# Patient Record
Sex: FEMALE | Race: WHITE | NOT HISPANIC OR LATINO | Employment: FULL TIME | URBAN - NONMETROPOLITAN AREA
[De-identification: names, ages, dates, MRNs, and addresses within clinical notes are randomized per-mention and may not be internally consistent; named-entity substitution may affect disease eponyms.]

---

## 2018-10-10 ENCOUNTER — OCCUPATIONAL MEDICINE (OUTPATIENT)
Dept: URGENT CARE | Facility: PHYSICIAN GROUP | Age: 73
End: 2018-10-10
Payer: COMMERCIAL

## 2018-10-10 VITALS
HEART RATE: 68 BPM | DIASTOLIC BLOOD PRESSURE: 76 MMHG | OXYGEN SATURATION: 95 % | SYSTOLIC BLOOD PRESSURE: 128 MMHG | WEIGHT: 186 LBS | RESPIRATION RATE: 16 BRPM | HEIGHT: 64 IN | BODY MASS INDEX: 31.76 KG/M2 | TEMPERATURE: 97.4 F

## 2018-10-10 DIAGNOSIS — S86.912D STRAIN OF LEFT KNEE, SUBSEQUENT ENCOUNTER: ICD-10-CM

## 2018-10-10 PROCEDURE — 99203 OFFICE O/P NEW LOW 30 MIN: CPT | Mod: 29 | Performed by: PHYSICIAN ASSISTANT

## 2018-10-10 NOTE — LETTER
[facility]  [place] [address]  Phone:  [phone] - Fax:  [phone]   [facility] Progress Report and Disability Certification  Date of Service: 10/10/2018   No Show:  No  Date / Time of Next Visit:     Claim Information   Patient Name: Angela Sims  Claim Number:     Employer:   [employer] Date of Injury: 10/7/2018     Insurer / TPA: [employer]  ID / SSN:     Occupation: Associate  Diagnosis: The encounter diagnosis was Strain of left knee, subsequent encounter.    Medical Information   Related to Industrial Injury? Yes    Subjective Complaints:  Left knee pain after injury at work on 10/7/2018   Objective Findings: .left knee is without any visual deformity, erythema, edema or ecchymosis.  She has good range of motion albeit somewhat painful.  She has tenderness to palpation in the popliteal space.  Good distal circulation and sensation and strength    Pre-Existing Condition(s):     Assessment:   Condition Improved  Comments:First visit was in the emergency room    Status: Additional Care Required  Comments:Follow-up in 1 week  Permanent Disability:No    Plan: Medication  Comments:Over-the-counter anti-inflammatories    Diagnostics: X-ray  Comments:Negative, from another facility    Comments:       Disability Information   Status:      From:     Through:   Restrictions are:     Physical Restrictions   Sitting:    Standing:  < or = to 6 hrs/day Stoopin hrs/day Bendin hrs/day   Squatting:    Walking:  < or = to 6 hrs/day Climbin hrs/day Pushin hrs/day   Pullin hrs/day Other:    Reaching Above Shoulder (L):   Reaching Above Shoulder (R):       Reaching Below Shoulder (L):    Reaching Below Shoulder (R):      Not to exceed Weight Limits   Carrying(hrs):   Weight Limit(lb): < or = to 10 pounds Lifting(hrs):   Weight  Limit(lb): < or = to 10 pounds   Comments: No prolonged standing or walking.  Specifically no  more than 30 minutes at a time without being able to rest and elevate the left knee    Repetitive Actions   Hands: i.e. Fine Manipulations from Grasping:     Feet: i.e. Operating Foot Controls:     Driving / Operate Machinery:     Physician Name: Maikel Lambert P.A.-C. Physician Signature: MAIKEL Kyle P.A.-C. e-Signature: Dr. José Miguel Freeman, Medical Director   Clinic Name / Location: [address] [address] Clinic Phone Number: Dept: [phone]   Appointment Time: 3:35 Pm Visit Start Time: 4:02 PM   Check-In Time:  3:42 Pm Visit Discharge Time:  4:18 PM   Original-Treating Physician or Chiropractor    Page 2-Insurer/TPA    Page 3-Employer    Page 4-Employee

## 2018-10-10 NOTE — PROGRESS NOTES
Chief Complaint   Patient presents with   • Knee Injury     L knee       HISTORY OF PRESENT ILLNESS: Patient is a 72 y.o. female who presents today because she is here for work comp injury.    Date of injury 10/7/2018.  This is her first visit in urgent care, she was seen at a local emergency room the day after the injury.    She was coming down off of a ladder and missed the last rung and hurt her left knee.    She was evaluated at Local emergency room the next morning, had a negative x-ray for fracture.  She was diagnosed with left knee strain and told to use crutches, compress and anti-inflammatories.  Since she was evaluated 2 days ago, she has been using an Ace wrap, ice, anti-inflammatories and it seems like it is somewhat better but hurts with any bending or stooping.  She is able to walk on it with some pain, no distal paresthesias.          There are no active problems to display for this patient.      Allergies:Codeine and Sulfa drugs    No current Epic-ordered outpatient prescriptions on file.     No current Epic-ordered facility-administered medications on file.        No past medical history on file.    Social History   Substance Use Topics   • Smoking status: Not on file   • Smokeless tobacco: Not on file   • Alcohol use Not on file       No family status information on file.   No family history on file.    ROS:  Review of Systems   Constitutional: Negative for fever, chills, weight loss and malaise/fatigue.   HENT: Negative for ear pain, nosebleeds, congestion, sore throat and neck pain.    Eyes: Negative for blurred vision.   Respiratory: Negative for cough, sputum production, shortness of breath and wheezing.    Cardiovascular: Negative for chest pain, palpitations, orthopnea and leg swelling.   Gastrointestinal: Negative for heartburn, nausea, vomiting and abdominal pain.   Genitourinary: Negative for dysuria, urgency and frequency.     Exam:  Pulse 68, temperature 36.3 °C (97.4 °F), temperature  "source Temporal, resp. rate 16, height 1.626 m (5' 4\"), weight 84.4 kg (186 lb), SpO2 95 %.  General:  Well nourished, well developed female in NAD  Head:Normocephalic, atraumatic  Eyes: PERRLA, EOM within normal limits, no conjunctival injection, no scleral icterus, visual fields and acuity grossly intact.  Extremities: no clubbing, cyanosis, or edema.left knee is without any visual deformity, erythema, edema or ecchymosis.  She has good range of motion albeit somewhat painful.  She has tenderness to palpation in the popliteal space.  Good distal circulation and sensation and strength     Please note that this dictation was created using voice recognition software. I have made every reasonable attempt to correct obvious errors, but I expect that there are errors of grammar and possibly content that I did not discover before finalizing the note.    Assessment/Plan:  1. Strain of left knee, subsequent encounter       Restrictions, rest, ice, anti-inflammatories and follow-up in 1 week          "

## 2018-10-16 ENCOUNTER — OCCUPATIONAL MEDICINE (OUTPATIENT)
Dept: URGENT CARE | Facility: PHYSICIAN GROUP | Age: 73
End: 2018-10-16
Payer: COMMERCIAL

## 2018-10-16 VITALS
HEART RATE: 72 BPM | OXYGEN SATURATION: 95 % | HEIGHT: 64 IN | TEMPERATURE: 96.8 F | WEIGHT: 186 LBS | SYSTOLIC BLOOD PRESSURE: 130 MMHG | RESPIRATION RATE: 14 BRPM | DIASTOLIC BLOOD PRESSURE: 76 MMHG | BODY MASS INDEX: 31.76 KG/M2

## 2018-10-16 DIAGNOSIS — S86.912D STRAIN OF LEFT KNEE, SUBSEQUENT ENCOUNTER: ICD-10-CM

## 2018-10-16 PROCEDURE — 99214 OFFICE O/P EST MOD 30 MIN: CPT | Mod: 29 | Performed by: PHYSICIAN ASSISTANT

## 2018-10-16 NOTE — LETTER
[facility]  [address] [place]  [place] [address]  Phone:  [phone] - Fax:  [phone]   [facility] Progress Report and Disability Certification  Date of Service: 10/16/2018   No Show:  No  Date / Time of Next Visit:     Claim Information   Patient Name: Angela Sims  Claim Number:     Employer:   [employer] Date of Injury: 10/7/2018     Insurer / TPA: [employer]  ID / SSN:     Occupation: Associate  Diagnosis: The encounter diagnosis was Strain of left knee, subsequent encounter.    Medical Information   Related to Industrial Injury?   yes   Subjective Complaints:  Resolved left knee pain from injury at work on 10/7/2018.   Objective Findings:  Left knee is without any visual deformity, erythema, edema or ecchymosis.  She has good range of motion, good strength, good distal circulation and sensation.     Pre-Existing Condition(s):     Assessment:   Condition Improved    Status: Discharged /  MMI  Permanent Disability:     Plan:      Diagnostics:      Comments:       Disability Information   Status: Released to Full Duty    From:     Through:   Restrictions are:     Physical Restrictions   Sitting:    Standing:    Stooping:    Bending:      Squatting:    Walking:    Climbing:    Pushing:      Pulling:    Other:    Reaching Above Shoulder (L):   Reaching Above Shoulder (R):       Reaching Below Shoulder (L):    Reaching Below Shoulder (R):      Not to exceed Weight Limits   Carrying(hrs):   Weight Limit(lb):   Lifting(hrs):   Weight  Limit(lb):     Comments:      Repetitive Actions   Hands: i.e. Fine Manipulations from Grasping:     Feet: i.e. Operating Foot Controls:     Driving / Operate Machinery:     Physician Name: Mario Lambert P.A.-C. Physician Signature: MARIO Kyle P.A.-C. e-Signature: Dr. José Miguel Freeman, Medical Director   Clinic Name / Location: [address] [place] [address] Clinic Phone  Number: Dept: [phone]   Appointment Time: 8:45 Am Visit Start Time: 8:56 AM   Check-In Time:  8:46 Am Visit Discharge Time:  904am   Original-Treating Physician or Chiropractor    Page 2-Insurer/TPA    Page 3-Employer    Page 4-Employee

## 2018-10-16 NOTE — PROGRESS NOTES
"Chief Complaint   Patient presents with   • Follow-Up       HISTORY OF PRESENT ILLNESS: Patient is a 73 y.o. female who presents today because she is following up on a work comp injury.    Date of injury 10/7/2018. This is her first visit in urgent care, she was seen at a local emergency room the day after the injury.   She was coming down off of a ladder and missed the last rung and hurt her left knee.   She was evaluated at Local emergency room the next morning, had a negative x-ray for fracture. She was diagnosed with left knee strain and told to use crutches, compress and anti-inflammatories.  Since her last visit 6 days ago, she reports complete resolution of pain.      There are no active problems to display for this patient.      Allergies:Codeine and Sulfa drugs    No current [facility]-ordered outpatient prescriptions on file.     No current [facility]-ordered facility-administered medications on file.        No past medical history on file.    Social History   Substance Use Topics   • Smoking status: Former Smoker   • Smokeless tobacco: Never Used   • Alcohol use No       No family status information on file.   No family history on file.    ROS:  Review of Systems   Constitutional: Negative for fever, chills, weight loss and malaise/fatigue.   HENT: Negative for ear pain, nosebleeds, congestion, sore throat and neck pain.    Eyes: Negative for blurred vision.   Respiratory: Negative for cough, sputum production, shortness of breath and wheezing.    Cardiovascular: Negative for chest pain, palpitations, orthopnea and leg swelling.   Gastrointestinal: Negative for heartburn, nausea, vomiting and abdominal pain.   Genitourinary: Negative for dysuria, urgency and frequency.     Exam:  Blood pressure 130/76, pulse 72, temperature 36 °C (96.8 °F), temperature source Temporal, resp. rate 14, height 1.626 m (5' 4\"), weight 84.4 kg (186 lb), SpO2 95 %.  General:  Well nourished, well developed female in " NAD  Head:Normocephalic, atraumatic  Eyes: PERRLA, EOM within normal limits, no conjunctival injection, no scleral icterus, visual fields and acuity grossly intact.  Nose: Symmetrical without tenderness, no discharge.  Mouth: reasonable hygiene, no erythema exudates or tonsillar enlargement.  Extremities: no clubbing, cyanosis, or edema.  Left knee I has no visual deformity, erythema, edema or ecchymosis.  She has good distal circulation, sensation and strength.  No tenderness to palpation.  Please note that this dictation was created using voice recognition software. I have made every reasonable attempt to correct obvious errors, but I expect that there are errors of grammar and possibly content that I did not discover before finalizing the note.    Assessment/Plan:  1. Strain of left knee, subsequent encounter         \MMI

## 2018-10-25 ENCOUNTER — OCCUPATIONAL MEDICINE (OUTPATIENT)
Dept: URGENT CARE | Facility: PHYSICIAN GROUP | Age: 73
End: 2018-10-25
Payer: COMMERCIAL

## 2018-10-25 ENCOUNTER — APPOINTMENT (OUTPATIENT)
Dept: RADIOLOGY | Facility: IMAGING CENTER | Age: 73
End: 2018-10-25
Attending: PHYSICIAN ASSISTANT
Payer: COMMERCIAL

## 2018-10-25 VITALS
RESPIRATION RATE: 16 BRPM | HEIGHT: 64 IN | HEART RATE: 65 BPM | SYSTOLIC BLOOD PRESSURE: 132 MMHG | DIASTOLIC BLOOD PRESSURE: 78 MMHG | TEMPERATURE: 98.7 F | OXYGEN SATURATION: 97 % | BODY MASS INDEX: 32.95 KG/M2 | WEIGHT: 193 LBS

## 2018-10-25 DIAGNOSIS — S69.91XA INJURY OF RIGHT HAND, INITIAL ENCOUNTER: ICD-10-CM

## 2018-10-25 PROCEDURE — 99214 OFFICE O/P EST MOD 30 MIN: CPT | Mod: 29 | Performed by: PHYSICIAN ASSISTANT

## 2018-10-25 PROCEDURE — 73130 X-RAY EXAM OF HAND: CPT | Mod: TC,FY,RT | Performed by: FAMILY MEDICINE

## 2018-10-25 RX ORDER — LISINOPRIL 20 MG/1
20 TABLET ORAL DAILY
COMMUNITY

## 2018-10-25 RX ORDER — KRILL/OM-3/DHA/EPA/PHOSPHO/AST 500-110 MG
CAPSULE ORAL
COMMUNITY

## 2018-10-25 RX ORDER — ESTRADIOL 0.5 MG/1
0.5 TABLET ORAL DAILY
COMMUNITY

## 2018-10-25 RX ORDER — MECLIZINE HYDROCHLORIDE 25 MG/1
25 TABLET ORAL 3 TIMES DAILY PRN
COMMUNITY

## 2018-10-25 RX ORDER — LEVOTHYROXINE SODIUM 112 UG/1
112 TABLET ORAL
COMMUNITY

## 2018-10-25 RX ORDER — FENOFIBRATE 160 MG/1
160 TABLET ORAL DAILY
COMMUNITY

## 2018-10-25 RX ORDER — CARVEDILOL 25 MG/1
25 TABLET ORAL 2 TIMES DAILY WITH MEALS
COMMUNITY

## 2018-10-25 NOTE — LETTER
[facility]  [place] [address]  Phone:  [phone] - Fax:  [phone]   [facility] Progress Report and Disability Certification  Date of Service: 10/25/2018   No Show:  No  Date / Time of Next Visit: 10/31/2018   Claim Information   Patient Name: Angela Sims  Claim Number:     Employer:   [employer] Date of Injury: 10/20/2018     Insurer / TPA: [employer]  ID / SSN:     Occupation:   Diagnosis: The encounter diagnosis was Injury of right hand, initial encounter.    Medical Information   Related to Industrial Injury?   Comments:Fell in parking lot outside of work    Subjective Complaints:  Right hand pain after fall at work on 10/20/2018   Objective Findings:  Right hand is somewhat visibly swollen to the dorsal surface.  She has somewhat reduced range of motion secondary to pain, good distal circulation and sensation.       Pre-Existing Condition(s):     Assessment:   Initial Visit    Status: Additional Care Required  Comments:Follow-up in 6 days  Permanent Disability:No    Plan: Medication  Comments:Over-the-counter Tylenol    Diagnostics: X-ray  Comments:Pending    Comments:       Disability Information   Status: Released to Full Duty    From:  10/25/2018  Through: 10/31/2018 Restrictions are: Temporary   Physical Restrictions   Sitting:    Standing:    Stooping:    Bending:      Squatting:    Walking:    Climbing:    Pushing:      Pulling:    Other:    Reaching Above Shoulder (L):   Reaching Above Shoulder (R):       Reaching Below Shoulder (L):    Reaching Below Shoulder (R):      Not to exceed Weight Limits   Carrying(hrs):   Weight Limit(lb):   Lifting(hrs):   Weight  Limit(lb):     Comments: May return to work full duty.  However requires follow-up in 6 days    Repetitive Actions   Hands: i.e. Fine Manipulations from Grasping:     Feet: i.e. Operating Foot Controls:     Driving / Operate Machinery:     Physician  Name: Maikel Lambert P.A.-C. Physician Signature: MAIKEL Kyle P.A.-C. e-Signature: Dr. José Miguel Freeman, Medical Director   Clinic Name / Location: [address] [address] Clinic Phone Number: Dept: [phone]   Appointment Time: 4:05 Pm Visit Start Time: 4:20 PM   Check-In Time:  4:06 Pm Visit Discharge Time:  4:49 PM   Original-Treating Physician or Chiropractor    Page 2-Insurer/TPA    Page 3-Employer    Page 4-Employee

## 2018-10-25 NOTE — PROGRESS NOTES
Chief Complaint   Patient presents with   • Hand Injury     right       HISTORY OF PRESENT ILLNESS: Patient is a 73 y.o. female who presents today because she is here with a work comp injury.    Date of injury 10/20/2018.  This is her first visit for this injury.    She was leaving work on 10/20/2018.  She had just clocked out.  She is walking through the parking lot and there was a bolt sticking out of the pavement in the parking lot, she tripped on it fell forward landing on her chest and right hand.  Her right hand became painful and swollen by the next morning.  She tried some ibuprofen but she reports that she had some short of breath after taking the ibuprofen.  She has not been icing it, has not been taking any anti-inflammatories or Tylenol.  Denies any distal paresthesias.  Reports that the swelling is much better now but still swollen and somewhat painful.  It is not painful to lift, only to bend and grasp    There are no active problems to display for this patient.      Allergies:Codeine and Sulfa drugs    Current Outpatient Prescriptions Ordered in [facility]   Medication Sig Dispense Refill   • meclizine (ANTIVERT) 25 MG Tab Take 25 mg by mouth 3 times a day as needed.     • carvedilol (COREG) 25 MG Tab Take 25 mg by mouth 2 times a day, with meals.     • estradiol (ESTRACE) 0.5 MG tablet Take 0.5 mg by mouth every day.     • fenofibrate (TRIGLIDE) 160 MG tablet Take 160 mg by mouth every day.     • levothyroxine (SYNTHROID) 112 MCG Tab Take 112 mcg by mouth Every morning on an empty stomach.     • lisinopril (PRINIVIL) 20 MG Tab Take 20 mg by mouth every day.     • aspirin EC (ECOTRIN) 81 MG Tablet Delayed Response Take 81 mg by mouth every day.     • Flaxseed, Linseed, (FLAXSEED OIL) 1200 MG Cap Take  by mouth.     • B Complex-C (SUPER B COMPLEX PO) Take  by mouth.     • Garlic 1000 MG Cap Take  by mouth.     • Krill Oil (OMEGA-3) 500 MG Cap Take  by mouth.       No current Epic-ordered  "facility-administered medications on file.        History reviewed. No pertinent past medical history.    Social History   Substance Use Topics   • Smoking status: Former Smoker   • Smokeless tobacco: Never Used   • Alcohol use No       No family status information on file.   History reviewed. No pertinent family history.    ROS:  Review of Systems   Constitutional: Negative for fever, chills, weight loss and malaise/fatigue.   HENT: Negative for ear pain, nosebleeds, congestion, sore throat and neck pain.    Eyes: Negative for blurred vision.   Respiratory: Negative for cough, sputum production, shortness of breath and wheezing.    Cardiovascular: Negative for chest pain, palpitations, orthopnea and leg swelling.   Gastrointestinal: Negative for heartburn, nausea, vomiting and abdominal pain.   Genitourinary: Negative for dysuria, urgency and frequency.     Exam:  Blood pressure 132/78, pulse 65, temperature 37.1 °C (98.7 °F), temperature source Temporal, resp. rate 16, height 1.626 m (5' 4\"), weight 87.5 kg (193 lb), SpO2 97 %.  General:  Well nourished, well developed female in NAD  Head:Normocephalic, atraumatic  Eyes: PERRLA, EOM within normal limits, no conjunctival injection, no scleral icterus, visual fields and acuity grossly intact.  Pulmonary: chest is symmetrical with respiration, no wheezes, crackles, or rhonchi.  Cardiovascular: regular rate and rhythm without murmurs, rubs, or gallops.  Extremities: no clubbing, cyanosis, or edema.  Right hand is somewhat visibly swollen to the dorsal surface.  She has somewhat reduced range of motion secondary to pain, good distal circulation and sensation.    Please note that this dictation was created using voice recognition software. I have made every reasonable attempt to correct obvious errors, but I expect that there are errors of grammar and possibly content that I did not discover before finalizing the note.    Assessment/Plan:  1. Injury of right hand, " initial encounter  DX-HAND 3+ RIGHT    X-ra pending.  Recommended over-the-counter Tylenol, ice to the area.  She does have a history of arthritis, likelyy exacerbation of arthritis when she fell.    Followup with primary care in the next 7-10 days if not significantly improving, return to the urgent care or go to the emergency room sooner for any worsening of symptoms.

## 2018-10-25 NOTE — LETTER
"EMPLOYEE’S CLAIM FOR COMPENSATION/ REPORT OF INITIAL TREATMENT  FORM C-4    EMPLOYEE’S CLAIM - PROVIDE ALL INFORMATION REQUESTED   First Name  Angela Last Name  Levi Birthdate                    1945                Sex  female Claim Number   Home Address  [address] Age  73 y.o. Height  1.626 m (5' 4\") Weight  87.5 kg (193 lb) [facility] Zip  [address] Telephone  [phone] (home)    Mailing Address  [address] [facility] Zip  [address] Primary Language Spoken  English    Insurer   Third Party   [employer]   Employee's Occupation (Job Title) When Injury or Occupational Disease Occurred      Employer's Name    [employer] Telephone   [phone]   Employer Address   [address] Zip   [address]   Date of Injury  10/20/2018               Hour of Injury  4:15 PM Date Employer Notified  10/20/2018 Last Day of Work after Injury or Occupational Disease  10/23/2018 Supervisor to Whom Injury Reported  Jyothi A   Address or Location of Accident (if applicable)  [[facility]]   What were you doing at the time of accident? (if applicable)  Walking to my car    How did this injury or occupational disease occur? (Be specific an answer in detail. Use additional sheet if necessary)  I was going home exited through [place] to my 's vehicle tripped on a bolt sticking up out of the cement and fell   If you believe that you have an occupational disease, when did you first have knowledge of the disability and it relationship to your employment?  na Witnesses to the Accident  Felipe Ford      Nature of Injury or Occupational Disease  Defer  Part(s) of Body Injured or Affected  Hand (R), ,     I certify that the above is true and correct to the best of my knowledge and that I have provided this information in order to obtain the benefits " of [place]  I hereby authorize any physician, chiropractor, surgeon, practitioner, or other person, any hospital, including [facility] or [facility], any medical service organization, any insurance company, or other institution or organization to release to each other, any medical or other information, including benefits paid or payable, pertinent to this injury or disease, except information relative to diagnosis, treatment and/or counseling for AIDS, psychological conditions, alcohol or controlled substances, for which I must give specific authorization.  A Photostat of this authorization shall be as valid as the original.     Date 10/25/2018   [facility]   Employee’s Signature   THIS REPORT MUST BE COMPLETED AND MAILED WITHIN 3 WORKING DAYS OF TREATMENT   [facility]  Name of Facility  [place]   Date  10/25/2018 Diagnosis  (S69.91XA) Injury of right hand, initial encounter Is there evidence the injured employee was under the influence of alcohol and/or another controlled substance at the time of accident?   Hour  4:20 PM Description of Injury or Disease  The encounter diagnosis was Injury of right hand, initial encounter. No   Treatment  Over-the-counter Tylenol, ice to the area.  Have you advised the patient to remain off work five days or more? No   X-Ray Findings    Comments:X-ray pending   If Yes   From Date  To Date      From information given by the employee, together with medical evidence, can you directly connect this injury or occupational disease as job incurred?    Comments:Fell in the parking lot outside of work If No Full Duty  Yes Modified Duty      Is additional medical care by a physician indicated?  Yes  Comments:Follow-up in 6 days If Modified Duty, Specify any Limitations / Restrictions      Do you know of any previous injury or disease  "contributing to this condition or occupational disease?                            Yes  Comments:History of arthritis   Date  10/25/2018 Print Doctor’s Name Maikel Lambert P.A.-C. I certify the employer’s copy of  this form was mailed on:   Address  [address] Insurer’s Use Only     [place] Zip  [address]    Provider’s Tax ID Number  [id number] Telephone  Dept: [phone]        e-KeyannaTAMAIKEL TREVINO P.A.-C.   e-Signature: Dr. José Miguel Freeman, Medical Director Degree           ORIGINAL-TREATING PHYSICIAN OR CHIROPRACTOR    PAGE 2-INSURER/TPA    PAGE 3-EMPLOYER    PAGE 4-EMPLOYEE             Form C-4 (rev10/07)              BRIEF DESCRIPTION OF RIGHTS AND BENEFITS  (Pursuant to [place] 616C.050)    Notice of Injury or Occupational Disease (Incident Report Form C-1): If an injury or occupational disease (OD) arises out of and in the  course of employment, you must provide written notice to your employer as soon as practicable, but no later than 7 days after the accident or  OD. Your employer shall maintain a sufficient supply of the required forms.    Claim for Compensation (Form C-4): If medical treatment is sought, the form C-4 is available at the place of initial treatment. A completed  \"Claim for Compensation\" (Form C-4) must be filed within 90 days after an accident or OD. The treating physician or chiropractor must,  within 3 working days after treatment, complete and mail to the employer, the employer's insurer and third-party , the Claim for  Compensation.    Medical Treatment: If you require medical treatment for your on-the-job injury or OD, you may be required to select a physician or  chiropractor from a list provided by your workers’ compensation insurer, if it has contracted with an Organization for Managed Care (MCO) or  Preferred Provider Organization (PPO) or providers of health care. If your employer has not entered into a contract with an MCO or PPO, you  may " select a physician or chiropractor from the Panel of Physicians and Chiropractors. Any medical costs related to your industrial injury or  OD will be paid by your insurer.    Temporary Total Disability (TTD): If your doctor has certified that you are unable to work for a period of at least 5 consecutive days, or 5  cumulative days in a 20-day period, or places restrictions on you that your employer does not accommodate, you may be entitled to TTD  compensation.    Temporary Partial Disability (TPD): If the wage you receive upon reemployment is less than the compensation for TTD to which you are  entitled, the insurer may be required to pay you TPD compensation to make up the difference. TPD can only be paid for a maximum of 24  months.    Permanent Partial Disability (PPD): When your medical condition is stable and there is an indication of a PPD as a result of your injury or  OD, within 30 days, your insurer must arrange for an evaluation by a rating physician or chiropractor to determine the degree of your PPD. The  amount of your PPD award depends on the date of injury, the results of the PPD evaluation and your age and wage.    Permanent Total Disability (PTD): If you are medically certified by a treating physician or chiropractor as permanently and totally disabled  and have been granted a PTD status by your insurer, you are entitled to receive monthly benefits not to exceed 66 2/3% of your average  monthly wage. The amount of your PTD payments is subject to reduction if you previously received a PPD award.    Vocational Rehabilitation Services: You may be eligible for vocational rehabilitation services if you are unable to return to the job due to a  permanent physical impairment or permanent restrictions as a result of your injury or occupational disease.    Transportation and Per [place] Reimbursement: You may be eligible for travel expenses and per abimbola associated with medical treatment.    Reopening: You may  be able to reopen your claim if your condition worsens after claim closure.    Appeal Process: If you disagree with a written determination issued by the insurer or the insurer does not respond to your request, you may  appeal to the Department of Administration, , by following the instructions contained in your determination letter. You must  appeal the determination within 70 days from the date of the determination letter at [address], [address], [place], [place]  [address], or [address], [place], [place] [address]. If you disagree with the  decision, you may appeal to the  Department of Administration, . You must file your appeal within 30 days from the date of the  decision  letter at [address], [address], [place], [place] [address], or [address], [place], [place] [address]. If you  disagree with a decision of an , you may file a petition for judicial review with the District Court. You must do so within 30  days of the Appeal Officer’s decision. You may be represented by an  at your own expense or you may contact the [facility] for possible  representation.    [place]  for Injured Workers (NAIW): If you disagree with a  decision, you may request that NAIW represent you  without charge at an  Hearing. For information regarding denial of benefits, you may contact the [facility] at: [address], [place] [address], [phone], or [address], [place] [address], [phone]    To File a Complaint with the Division: If you wish to file a complaint with the  of the Division of Industrial Relations (DIR),  please contact the Workers’ Compensation Section, [address], [address], [place], [place] [address], telephone [phone]) [phone], or  [address], [place] [address], telephone [phone].    For assistance with Workers’ Compensation Issues: you may contact the Office of the Governor Consumer Health Assistance, [address] [place] [address], [address], [place], [place] [address], Toll Free 1-372.319.2112, Web site: http://[employer].[facility].nv., E-mail  [email or web address].[email or web address].                                                                                                                                                                                                                                   __________________________________________________________________                                                                   _________10/25/2018____                Employee Name / Signature                                                                                                                                                       Date                                                                                                                                                                                                     D-2 (rev. 10/07)

## 2018-10-30 ENCOUNTER — OCCUPATIONAL MEDICINE (OUTPATIENT)
Dept: URGENT CARE | Facility: PHYSICIAN GROUP | Age: 73
End: 2018-10-30
Payer: COMMERCIAL

## 2018-10-30 VITALS
HEART RATE: 64 BPM | OXYGEN SATURATION: 96 % | WEIGHT: 193 LBS | SYSTOLIC BLOOD PRESSURE: 124 MMHG | BODY MASS INDEX: 32.95 KG/M2 | RESPIRATION RATE: 16 BRPM | DIASTOLIC BLOOD PRESSURE: 66 MMHG | HEIGHT: 64 IN | TEMPERATURE: 96.4 F

## 2018-10-30 DIAGNOSIS — S69.91XD INJURY OF RIGHT HAND, SUBSEQUENT ENCOUNTER: ICD-10-CM

## 2018-10-30 PROCEDURE — 99214 OFFICE O/P EST MOD 30 MIN: CPT | Mod: 29 | Performed by: PHYSICIAN ASSISTANT

## 2018-10-30 NOTE — PROGRESS NOTES
Chief Complaint   Patient presents with   • Follow-Up       HISTORY OF PRESENT ILLNESS: Patient is a 73 y.o. female who presents today because she is following up and work comp injury.  Date of injury 10/20/2018. This is her second visit for this injury.   She was leaving work on 10/20/2018. She had just clocked out. She is walking through the parking lot and there was a bolt sticking out of the pavement in the parking lot, she tripped on it fell forward landing on her chest and right hand. Her right hand became painful and swollen by the next morning. She tried some ibuprofen but she reports that she had some short of breath after taking the ibuprofen.   She was seen in the urgent care 5 days later, had a x-ray on her hand which showed:    1.  There is no acute or subacute fracture or malalignment of the right hand.  2.  There is mild degenerative change in the radiocarpal joint and distal radioulnar joint.    Since her last visit 5 days ago, she has had complete resolution of pain and swelling.    There are no active problems to display for this patient.      Allergies:Codeine and Sulfa drugs    Current Outpatient Prescriptions Ordered in [facility]   Medication Sig Dispense Refill   • meclizine (ANTIVERT) 25 MG Tab Take 25 mg by mouth 3 times a day as needed.     • carvedilol (COREG) 25 MG Tab Take 25 mg by mouth 2 times a day, with meals.     • estradiol (ESTRACE) 0.5 MG tablet Take 0.5 mg by mouth every day.     • fenofibrate (TRIGLIDE) 160 MG tablet Take 160 mg by mouth every day.     • levothyroxine (SYNTHROID) 112 MCG Tab Take 112 mcg by mouth Every morning on an empty stomach.     • lisinopril (PRINIVIL) 20 MG Tab Take 20 mg by mouth every day.     • aspirin EC (ECOTRIN) 81 MG Tablet Delayed Response Take 81 mg by mouth every day.     • Flaxseed, Linseed, (FLAXSEED OIL) 1200 MG Cap Take  by mouth.     • B Complex-C (SUPER B COMPLEX PO) Take  by mouth.     • Garlic 1000 MG Cap Take  by mouth.     • Krill Oil  "(OMEGA-3) 500 MG Cap Take  by mouth.       No current Epic-ordered facility-administered medications on file.        No past medical history on file.    Social History   Substance Use Topics   • Smoking status: Former Smoker   • Smokeless tobacco: Never Used   • Alcohol use No       No family status information on file.   No family history on file.    ROS:  Review of Systems   Constitutional: Negative for fever, chills, weight loss and malaise/fatigue.   HENT: Negative for ear pain, nosebleeds, congestion, sore throat and neck pain.    Eyes: Negative for blurred vision.   Respiratory: Negative for cough, sputum production, shortness of breath and wheezing.    Cardiovascular: Negative for chest pain, palpitations, orthopnea and leg swelling.   Gastrointestinal: Negative for heartburn, nausea, vomiting and abdominal pain.   Genitourinary: Negative for dysuria, urgency and frequency.     Exam:  Blood pressure 124/66, pulse 64, temperature (!) 35.8 °C (96.4 °F), temperature source Temporal, resp. rate 16, height 1.626 m (5' 4\"), weight 87.5 kg (193 lb), SpO2 96 %.  General:  Well nourished, well developed female in NAD  Head:Normocephalic, atraumatic  Eyes: PERRLA, EOM within normal limits, no conjunctival injection, no scleral icterus, visual fields and acuity grossly intact.  Extremities: no clubbing, cyanosis, or edema.  Right hand is without any visual deformity, erythema, edema or ecchymosis.  She has good range of motion, good distal strength and circulation.    Please note that this dictation was created using voice recognition software. I have made every reasonable attempt to correct obvious errors, but I expect that there are errors of grammar and possibly content that I did not discover before finalizing the note.    Assessment/Plan:  1. Injury of right hand, subsequent encounter         MMI    "

## 2018-10-30 NOTE — LETTER
[address] [place]  [place] [address]  Phone:  [phone] - Fax:  [phone]   [facility] Progress Report and Disability Certification  Date of Service: 10/30/2018   No Show:  No  Date / Time of Next Visit:     Claim Information   Patient Name: Angela Sims  Claim Number:     Employer:   [employer] Date of Injury: 10/20/2018     Insurer / TPA: [employer]  ID / SSN:     Occupation:   Diagnosis: The encounter diagnosis was Injury of right hand, subsequent encounter.    Medical Information   Related to Industrial Injury?      Subjective Complaints:  Resolution of right hand injury   Objective Findings:   Right hand is without any visual deformity, erythema, edema or ecchymosis.  She has good range of motion, good distal strength and circulation.     Pre-Existing Condition(s):     Assessment:   Condition Improved    Status: Discharged /  MMI  Permanent Disability:No    Plan:      Diagnostics:      Comments:       Disability Information   Status: Released to Full Duty    From:     Through:   Restrictions are:     Physical Restrictions   Sitting:    Standing:    Stooping:    Bending:      Squatting:    Walking:    Climbing:    Pushing:      Pulling:    Other:    Reaching Above Shoulder (L):   Reaching Above Shoulder (R):       Reaching Below Shoulder (L):    Reaching Below Shoulder (R):      Not to exceed Weight Limits   Carrying(hrs):   Weight Limit(lb):   Lifting(hrs):   Weight  Limit(lb):     Comments:      Repetitive Actions   Hands: i.e. Fine Manipulations from Grasping:     Feet: i.e. Operating Foot Controls:     Driving / Operate Machinery:     Physician Name: Mario Lambert P.A.-C. Physician Signature: MARIO Kyle P.A.-C. e-Signature: Dr. José Miguel Freeman, Medical Director   Clinic Name / Location: [address] [place] [address] Clinic Phone Number: Dept: [phone]      Appointment Time: 10:40 Am Visit Start Time: 10:28 AM   Check-In Time:  9:55 Am Visit Discharge Time:  11:09 AM   Original-Treating Physician or Chiropractor    Page 2-Insurer/TPA    Page 3-Employer    Page 4-Employee

## 2019-05-08 ENCOUNTER — NON-PROVIDER VISIT (OUTPATIENT)
Dept: URGENT CARE | Facility: PHYSICIAN GROUP | Age: 74
End: 2019-05-08
Payer: MEDICARE

## 2019-05-08 DIAGNOSIS — Z02.83 ENCOUNTER FOR DRUG SCREENING: ICD-10-CM

## 2019-05-08 PROCEDURE — 7503 PR ESCREEN ACCT UDS COL ONLY: Performed by: PHYSICIAN ASSISTANT
